# Patient Record
Sex: FEMALE | Race: WHITE | NOT HISPANIC OR LATINO
[De-identification: names, ages, dates, MRNs, and addresses within clinical notes are randomized per-mention and may not be internally consistent; named-entity substitution may affect disease eponyms.]

---

## 2019-01-07 ENCOUNTER — TRANSCRIPTION ENCOUNTER (OUTPATIENT)
Age: 59
End: 2019-01-07

## 2019-01-07 ENCOUNTER — APPOINTMENT (OUTPATIENT)
Dept: NEUROLOGY | Facility: CLINIC | Age: 59
End: 2019-01-07
Payer: COMMERCIAL

## 2019-01-07 VITALS
BODY MASS INDEX: 25.11 KG/M2 | DIASTOLIC BLOOD PRESSURE: 80 MMHG | HEART RATE: 87 BPM | HEIGHT: 67 IN | SYSTOLIC BLOOD PRESSURE: 124 MMHG | WEIGHT: 160 LBS

## 2019-01-07 PROCEDURE — 99205 OFFICE O/P NEW HI 60 MIN: CPT

## 2019-01-07 NOTE — HISTORY OF PRESENT ILLNESS
[FreeTextEntry1] : Patient presents with what she thinks may be OPMD and would like to be evaluated.  She states her mother, maternal grandfather, aunt and cousins have OPMD.  Mother can eat but very slowly, and has trouble walking.  Her aunt has a PEG and is more affected.  Her aunt is genetically confirmed with repeat expansion in PABPN1 gene.  Patient has two children, currently unaffected.  \par \par About ten years ago she noted that she had mild ptosis, slowly worsened and slightly worse on the right side.  She states she has some proximal leg weakness and proximal arm weakness for about 6 months, difficulty climbing stairs, rising from chair and holding arms up to do her hair.  Doesn't cough with liquids but states she gets food stuck in her throat when she swallows.  She states she has diplopia with looking to the right or up.  She denies SOB, and has had no falls.  \par \par She states she used to be very active and no longer is due to weakness.

## 2019-01-07 NOTE — PHYSICAL EXAM
[General Appearance - Alert] : alert [General Appearance - In No Acute Distress] : in no acute distress [Person] : oriented to person [Place] : oriented to place [Time] : oriented to time [Short Term Intact] : short term memory intact [Remote Intact] : remote memory intact [Registration Intact] : recent registration memory intact [Concentration Intact] : normal concentrating ability [Visual Intact] : visual attention was ~T not ~L decreased [Naming Objects] : no difficulty naming common objects [Repeating Phrases] : no difficulty repeating a phrase [Writing A Sentence] : no difficulty writing a sentence [Fluency] : fluency intact [Comprehension] : comprehension intact [Reading] : reading intact [Past History] : adequate knowledge of personal past history [Cranial Nerves Optic (II)] : visual acuity intact bilaterally,  visual fields full to confrontation, pupils equal round and reactive to light [Cranial Nerves Oculomotor (III)] : extraocular motion intact [Cranial Nerves Trigeminal (V)] : facial sensation intact symmetrically [Cranial Nerves Facial (VII)] : face symmetrical [Cranial Nerves Vestibulocochlear (VIII)] : hearing was intact bilaterally [Cranial Nerves Glossopharyngeal (IX)] : tongue and palate midline [Cranial Nerves Accessory (XI - Cranial And Spinal)] : head turning and shoulder shrug symmetric [Cranial Nerves Hypoglossal (XII)] : there was no tongue deviation with protrusion [Motor Tone] : muscle tone was normal in all four extremities [No Muscle Atrophy] : normal bulk in all four extremities [Motor Handedness Right-Handed] : the patient is right hand dominant [Hand Weakness Right] : normal hand  [+4] : arm extension  +4/5 [Hand Weakness Left] : normal hand  [5] : ankle inversion 5/5 [Sensation Tactile Decrease] : light touch was intact [Sensation Vibration Decrease] : vibration was intact [Proprioception] : proprioception was intact [Abnormal Walk] : normal gait [Balance] : balance was intact [Past-pointing] : there was no past-pointing [Tremor] : no tremor present [2+] : Ankle jerk left 2+ [Plantar Reflex Right Only] : normal on the right [Plantar Reflex Left Only] : normal on the left [FreeTextEntry5] : bilateral ptosis [FreeTextEntry8] : slight difficulty with squat [Neck Appearance] : the appearance of the neck was normal [Neck Cervical Mass (___cm)] : no neck mass was observed [Jugular Venous Distention Increased] : there was no jugular-venous distention [Thyroid Diffuse Enlargement] : the thyroid was not enlarged [Thyroid Nodule] : there were no palpable thyroid nodules [] : no respiratory distress [Auscultation Breath Sounds / Voice Sounds] : lungs were clear to auscultation bilaterally [Heart Rate And Rhythm] : heart rate was normal and rhythm regular [Heart Sounds] : normal S1 and S2 [Heart Sounds Gallop] : no gallops [Murmurs] : no murmurs [Heart Sounds Pericardial Friction Rub] : no pericardial rub

## 2019-01-07 NOTE — ASSESSMENT
[FreeTextEntry1] : Patient has a strong family h/o OPMD with genetic confirmation and a phenotype of ptosis, diplopia and proximal limb weakness that suggests strongly she also has OPMD.\par \par Will eval for repeat expansion in PABPN1 gene.  If positive, will monitor patient and provide support and consider her for any upcoming clinical trials.  \par \par

## 2019-02-11 ENCOUNTER — LABORATORY RESULT (OUTPATIENT)
Age: 59
End: 2019-02-11

## 2020-08-11 ENCOUNTER — APPOINTMENT (OUTPATIENT)
Dept: ENDOCRINOLOGY | Facility: CLINIC | Age: 60
End: 2020-08-11

## 2022-09-26 ENCOUNTER — APPOINTMENT (OUTPATIENT)
Dept: NEUROLOGY | Facility: CLINIC | Age: 62
End: 2022-09-26

## 2022-09-26 VITALS
WEIGHT: 160 LBS | DIASTOLIC BLOOD PRESSURE: 76 MMHG | BODY MASS INDEX: 25.11 KG/M2 | HEIGHT: 67 IN | HEART RATE: 71 BPM | SYSTOLIC BLOOD PRESSURE: 118 MMHG

## 2022-09-26 DIAGNOSIS — F41.9 ANXIETY DISORDER, UNSPECIFIED: ICD-10-CM

## 2022-09-26 DIAGNOSIS — G71.09 OTHER SPECIFIED MUSCULAR DYSTROPHIES: ICD-10-CM

## 2022-09-26 PROCEDURE — 99203 OFFICE O/P NEW LOW 30 MIN: CPT

## 2022-09-26 NOTE — DATA REVIEWED
[de-identified] : I reviewed the consultation report of Dr. 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  There are no other reports available in the electronic medical records.  General examination is unremarkable.

## 2022-09-26 NOTE — DISCUSSION/SUMMARY
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  I advised her that currently there is no treatment for cure but several assistive measures can be taken and and referred her for an ENT consultation because of slowness of swallowing and if any measures can be undertaken and she understands and will call [facility] division of ENT meanwhile she has no internist or family physician at this time and was encouraged to call [facility] Department of medicine to have a good medical evaluation maintenance of general health and she understands.  She will remain under the care of her psychiatrist for anxiety depression and ENT physicians including health maintenance and was encouraged to call for follow-up on a as needed basis or at least once a year so that a monitoring can be undertaken and if there is any experimental protocols it will be easy to refer her if she so desires.  She expressed understanding.

## 2022-09-26 NOTE — PHYSICAL EXAM
[FreeTextEntry1] : General examination is unremarkable and she is a very pleasant lady with a height of 5 feet 7 inches and her weight is 160 pounds and is right-handed.  Head neck, ear nose and throat is unremarkable.  There is no carotid bruit thyromegaly or lymphadenopathy.  Chest is clear and heart sounds are normal.  Abdomen is soft.  Pedal pulsations are normal and there is no leg edema.\par \par Neurologically she has normal memory language and cognitive abilities but her attention span is somewhat poor and she stated that this has happened since the COVID infection but is neurologically stable without any other sequela I of COVID infection.  Cranial nerve examination reveals mild weakness of the upward gaze of the left eye but without any clearly discernible diplopia as she has bilateral ptosis but is able to use her vision without any difficulty and her optic disks are normal visual fields are full there is no optic atrophy and extraocular movements are otherwise unremarkable with normal downward gaze.  Facial sensations are preserved but there is mild bilateral facial weakness with a minimal fishmouth opening.  Gag reflex is normal palatal arches are maintained and masseter muscles are normal and there is no loss of sense of smell or taste and neck muscles are +4/5.\par \par Motor evaluation revealed diffuse muscle weakness with mild hypotonia and her upper extremity muscles are +4/5 whereas lower extremity muscles are -5/5 in the distal muscles but dorsiflexors are +3/5 whereas plantar flexors are +4/5 and all deep tendon reflexes are trace to absent and there is no Babinski sign and careful sensory evaluation is completely normal including fine touch pinprick position and vibration sense and her gait is intact without any step as gait or foot drop gait and her proximal muscle functions appear normal in the legs as she has no difficulty in getting out of the chair.

## 2022-09-26 NOTE — REVIEW OF SYSTEMS
[Feeling Poorly] : feeling poorly [Decr. Concentrating Ability] : decreased concentrating ability [Facial Weakness] : facial weakness [Arm Weakness] : arm weakness [Hand Weakness] :  hand weakness [Leg Weakness] : leg weakness [Difficulty Writing] : difficulty writing [Tingling] : tingling [Difficulty Walking] : no difficulty walking [Inability to Walk] : able to walk [Ataxia] : no ataxia [Frequent Falls] : not falling [Limping] : limping [Anxiety] : anxiety [Depression] : depression [As Noted in HPI] : as noted in HPI [Negative] : Heme/Lymph

## 2022-09-26 NOTE — HISTORY OF PRESENT ILLNESS
[FreeTextEntry1] : Ms. Muro is a 61-year-old  female of [place] origin and was evaluated approximately 3-1/2 years ago by Dr. Brandon Cifuentes and a genetic testing was positive for ocular pharyngeal muscular dystrophy with a strong family history and today returns back for neurological consultation.\par \par She was evaluated by another neurologist on 1/7/2019 and because of her classic presentation suggested the diagnosis of ocular pharyngeal muscular dystrophy and stated that she had positive genetic testing including several family members have similar muscular dystrophy.  She has been relatively stable but believes that she is slightly weaker in the legs and had eyelid surgery in 2019 without any significant improvement though there was transient improvement for at least several months and stated that she has some swallowing problems including slow passage of water and she keeps clearing her throat after she drinks liquids but solids are easier but slow swallowing is noted and she occasionally has to drink water to facilitate solid food swallowing.  She denied any diplopia but in the past she had some vertical diplopia from the left eye.  There is diffuse muscle weakness in both upper and lower extremity which may be fluctuant but has more fatigue particularly in the legs.  There is vague history of some tingling in the feet but it is nonprogressive and no sensory symptoms are noted in the face or the upper extremity fingers and no shortness of breath palpitation or chest pain.\par \par Interim history reveals that she had COVID infection in December 2021 and had some COVID-related encephalopathy with memory issues and has been treated with Aricept and is relatively stable.\par \par Her past medical history was reviewed and had pelvic floor muscle weakness treated surgically for uterine prolapse.  Her father is suffering from advanced Parkinson's disease.  Several family members of ocular pharyngeal muscular dystrophy.  She is  with 2 children age 41 and 32 and 5 grandchildren and is currently not working because of progressive weakness and was relieved of her job her father is alive at age 90 and she has not been COVID vaccinated and she does not believe in vaccinations.\par \par There is also chronic history of anxiety depression and panic attacks and has been on buspirone lorazepam Wellbutrin mirtazapine and Aricept 10 mg daily and has no side effects of the medications and has been under the care of a psychiatrist.  She has no toxic habits and I reviewed her medications and allergies.

## 2022-09-27 ENCOUNTER — TRANSCRIPTION ENCOUNTER (OUTPATIENT)
Age: 62
End: 2022-09-27

## 2023-05-02 ENCOUNTER — APPOINTMENT (OUTPATIENT)
Dept: CARDIOLOGY | Facility: CLINIC | Age: 63
End: 2023-05-02
Payer: MEDICAID

## 2023-05-02 ENCOUNTER — NON-APPOINTMENT (OUTPATIENT)
Age: 63
End: 2023-05-02

## 2023-05-02 VITALS
BODY MASS INDEX: 24.17 KG/M2 | OXYGEN SATURATION: 98 % | TEMPERATURE: 97 F | WEIGHT: 154 LBS | RESPIRATION RATE: 16 BRPM | DIASTOLIC BLOOD PRESSURE: 65 MMHG | SYSTOLIC BLOOD PRESSURE: 95 MMHG | HEART RATE: 64 BPM | HEIGHT: 67 IN

## 2023-05-02 DIAGNOSIS — E78.00 PURE HYPERCHOLESTEROLEMIA, UNSPECIFIED: ICD-10-CM

## 2023-05-02 DIAGNOSIS — Z87.2 PERSONAL HISTORY OF DISEASES OF THE SKIN AND SUBCUTANEOUS TISSUE: ICD-10-CM

## 2023-05-02 DIAGNOSIS — R06.00 DYSPNEA, UNSPECIFIED: ICD-10-CM

## 2023-05-02 DIAGNOSIS — I20.9 ANGINA PECTORIS, UNSPECIFIED: ICD-10-CM

## 2023-05-02 DIAGNOSIS — F32.A ANXIETY DISORDER, UNSPECIFIED: ICD-10-CM

## 2023-05-02 DIAGNOSIS — F41.9 ANXIETY DISORDER, UNSPECIFIED: ICD-10-CM

## 2023-05-02 DIAGNOSIS — G72.9 MYOPATHY, UNSPECIFIED: ICD-10-CM

## 2023-05-02 DIAGNOSIS — Z86.39 PERSONAL HISTORY OF OTHER ENDOCRINE, NUTRITIONAL AND METABOLIC DISEASE: ICD-10-CM

## 2023-05-02 PROCEDURE — 93000 ELECTROCARDIOGRAM COMPLETE: CPT

## 2023-05-02 PROCEDURE — 99204 OFFICE O/P NEW MOD 45 MIN: CPT | Mod: 25

## 2023-05-02 NOTE — REVIEW OF SYSTEMS
[Feeling Fatigued] : feeling fatigued [Myalgia] : myalgia [Depression] : depression [Anxiety] : anxiety [Under Stress] : under stress [Negative] : Heme/Lymph [FreeTextEntry5] : see hpi

## 2023-05-02 NOTE — HISTORY OF PRESENT ILLNESS
[FreeTextEntry1] : 63 yo female with pmhx as below is here for initial eval\par chest pain with exertion last few months\par progressive in nature, decline in ET\par + gray as well\par long standing dyslipidemia, intolerant to multiple statins\par ros is otherwise as below

## 2023-07-06 ENCOUNTER — EMERGENCY (EMERGENCY)
Facility: HOSPITAL | Age: 63
LOS: 1 days | Discharge: HOME | End: 2023-07-06
Attending: EMERGENCY MEDICINE | Admitting: EMERGENCY MEDICINE
Payer: COMMERCIAL

## 2023-07-06 VITALS
OXYGEN SATURATION: 97 % | TEMPERATURE: 98 F | SYSTOLIC BLOOD PRESSURE: 147 MMHG | HEART RATE: 76 BPM | HEIGHT: 67 IN | DIASTOLIC BLOOD PRESSURE: 86 MMHG | WEIGHT: 154.32 LBS | RESPIRATION RATE: 18 BRPM

## 2023-07-06 PROCEDURE — 72110 X-RAY EXAM L-2 SPINE 4/>VWS: CPT

## 2023-07-06 PROCEDURE — 99053 MED SERV 10PM-8AM 24 HR FAC: CPT

## 2023-07-06 PROCEDURE — 99284 EMERGENCY DEPT VISIT MOD MDM: CPT | Mod: 25

## 2023-07-06 PROCEDURE — 70450 CT HEAD/BRAIN W/O DYE: CPT | Mod: 26,MA

## 2023-07-06 PROCEDURE — 72110 X-RAY EXAM L-2 SPINE 4/>VWS: CPT | Mod: 26

## 2023-07-06 PROCEDURE — 70450 CT HEAD/BRAIN W/O DYE: CPT | Mod: MA

## 2023-07-06 PROCEDURE — 99284 EMERGENCY DEPT VISIT MOD MDM: CPT

## 2023-07-06 RX ORDER — LIDOCAINE 4 G/100G
1 CREAM TOPICAL ONCE
Refills: 0 | Status: COMPLETED | OUTPATIENT
Start: 2023-07-06 | End: 2023-07-06

## 2023-07-06 RX ORDER — METHOCARBAMOL 500 MG/1
750 TABLET, FILM COATED ORAL ONCE
Refills: 0 | Status: COMPLETED | OUTPATIENT
Start: 2023-07-06 | End: 2023-07-06

## 2023-07-06 RX ORDER — ACETAMINOPHEN 500 MG
650 TABLET ORAL ONCE
Refills: 0 | Status: COMPLETED | OUTPATIENT
Start: 2023-07-06 | End: 2023-07-06

## 2023-07-06 RX ADMIN — METHOCARBAMOL 750 MILLIGRAM(S): 500 TABLET, FILM COATED ORAL at 23:36

## 2023-07-06 RX ADMIN — Medication 650 MILLIGRAM(S): at 23:36

## 2023-07-06 RX ADMIN — LIDOCAINE 1 PATCH: 4 CREAM TOPICAL at 23:36

## 2023-07-06 NOTE — ED PROVIDER NOTE - PATIENT PORTAL LINK FT
You can access the FollowMyHealth Patient Portal offered by [facility] by registering at the following website: [email or web address]. By joining [facility]’s FollowMyHealth portal, you will also be able to view your health information using other applications (apps) compatible with our system.
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
## 2023-07-06 NOTE — ED ADULT NURSE NOTE - NSFALLUNIVINTERV_ED_ALL_ED
Bed/Stretcher in lowest position, wheels locked, appropriate side rails in place/Call bell, personal items and telephone in reach/Instruct patient to call for assistance before getting out of bed/chair/stretcher/Non-slip footwear applied when patient is off stretcher/[place] to call system/Physically safe environment - no spills, clutter or unnecessary equipment/Purposeful proactive rounding/Room/bathroom lighting operational, light cord in reach
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
## 2023-07-06 NOTE — ED PROVIDER NOTE - CARE PROVIDER_API CALL
Joao Gar  Orthopaedic Surgery  [address], [address]  [place] [address]  Phone: [phone]  Fax: [phone]  Follow Up Time:     Сергей Azul [facility]  Spine Surgery  [address], [address]  [place] [address]  Phone: [phone]  Fax: [phone]  Follow Up Time:

## 2023-07-06 NOTE — ED PROVIDER NOTE - NSFOLLOWUPINSTRUCTIONS_ED_ALL_ED_FT
Lumbar Strain  A lumbar strain, which is sometimes called a low-back strain, is a stretch or tear in a muscle or the strong cords of tissue that attach muscle to bone (tendons) in the lower back (lumbar spine). This type of injury occurs when muscles or tendons are torn or are stretched beyond their limits.    Lumbar strains can range from mild to severe. Mild strains may involve stretching a muscle or tendon without tearing it. These may heal in 1–2 weeks. More severe strains involve tearing of muscle fibers or tendons. These will cause more pain and may take 6–8 weeks to heal.    What are the causes?  This condition may be caused by:  Trauma, such as a fall or a hit to the body.  Twisting or overstretching the back. This may result from doing activities that need a lot of energy, such as lifting heavy objects.  What increases the risk?  This injury is more common in:  Athletes.  People with obesity.  People who do repeated lifting, bending, or other movements that involve their back.  What are the signs or symptoms?  Symptoms of this condition may include:  Sharp or dull pain in the lower back that does not go away. The pain may extend to the buttocks.  Stiffness or limited range of motion.  Sudden muscle tightening (spasms).  How is this diagnosed?  This condition may be diagnosed based on:  Your symptoms.  Your medical history.  A physical exam.  Imaging tests, such as:  X-rays.  MRI.  How is this treated?  Treatment for this condition may include:  Rest.  Applying heat and cold to the affected area.  Over-the-counter medicines to help relieve pain and inflammation, such as NSAIDs.  Prescription pain medicine and muscle relaxants may be needed for a short time.  Physical therapy.  Follow these instructions at home:  Managing pain, stiffness, and swelling    Bag of ice on a towel on the skin.  A heating pad for use on the affected area.  If directed, put ice on the injured area during the first 24 hours after your injury.  Put ice in a plastic bag.  Place a towel between your skin and the bag.  Leave the ice on for 20 minutes, 2–3 times a day.  If directed, apply heat to the affected area as often as told by your health care provider. Use the heat source that your health care provider recommends, such as a moist heat pack or a heating pad.  Place a towel between your skin and the heat source.  Leave the heat on for 20–30 minutes.  Remove the heat if your skin turns bright red. This is especially important if you are unable to feel pain, heat, or cold. You may have a greater risk of getting burned.  Activity    Rest and return to your normal activities as told by your health care provider. Ask your health care provider what activities are safe for you.  Do exercises as told by your health care provider.  Medicines    Take over-the-counter and prescription medicines only as told by your health care provider.  Ask your health care provider if the medicine prescribed to you:  Requires you to avoid driving or using heavy machinery.  Can cause constipation. You may need to take these actions to prevent or treat constipation:  Drink enough fluid to keep your urine pale yellow.  Take over-the-counter or prescription medicines.  Eat foods that are high in fiber, such as beans, whole grains, and fresh fruits and vegetables.  Limit foods that are high in fat and processed sugars, such as fried or sweet foods.  Injury prevention    A person showing the correct and incorrect way to lift a heavy object. The correct way shows the person's knees bent.  To prevent a future low-back injury:  Always warm up properly before physical activity or sports.  Cool down and stretch after being active.  Use correct form when playing sports and lifting heavy objects. Bend your knees before you lift heavy objects.  Use good posture when sitting and standing.  Stay physically fit and keep a healthy weight.  Do at least 150 minutes of moderate-intensity exercise each week, such as brisk walking or water aerobics.  Do strength exercises at least 2 times each week.  General instructions    Do not use any products that contain nicotine or tobacco, such as cigarettes, e-cigarettes, and chewing tobacco. If you need help quitting, ask your health care provider.  Keep all follow-up visits as told by your health care provider. This is important.  Contact a health care provider if:  Your back pain does not improve after 6 weeks of treatment.  Your symptoms get worse.  Get help right away if:  Your back pain is severe.  You are unable to stand or walk.  You develop pain in your legs.  You develop weakness in your buttocks or legs.  You have difficulty controlling when you urinate or when you have a bowel movement.  You have frequent, painful, or bloody urination.  You have a temperature over 101.0°F (38.3°C)  Summary  A lumbar strain, which is sometimes called a low-back strain, is a stretch or tear in a muscle or the strong cords of tissue that attach muscle to bone (tendons) in the lower back (lumbar spine).  This type of injury occurs when muscles or tendons are torn or are stretched beyond their limits.  Rest and return to your normal activities as told by your health care provider. If directed, apply heat and ice to the affected area as often as told by your health care provider.  Take over-the-counter and prescription medicines only as told by your health care provider.  Contact a health care provider if you have new or worsening symptoms.  This information is not intended to replace advice given to you by your health care provider. Make sure you discuss any questions you have with your health care provider.

## 2023-07-06 NOTE — ED ADULT NURSE NOTE - ABDOMEN
soft/nondistended Information: Selecting Yes will display possible errors in your note based on the variables you have selected. This validation is only offered as a suggestion for you. PLEASE NOTE THAT THE VALIDATION TEXT WILL BE REMOVED WHEN YOU FINALIZE YOUR NOTE. IF YOU WANT TO FAX A PRELIMINARY NOTE YOU WILL NEED TO TOGGLE THIS TO 'NO' IF YOU DO NOT WANT IT IN YOUR FAXED NOTE.
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
## 2023-07-06 NOTE — ED PROVIDER NOTE - MUSCULOSKELETAL, MLM
Spine appears normal, range of motion is not limited, no muscle or joint tenderness. Mild paraspinal tenderness in lumbar area

## 2023-07-07 VITALS
TEMPERATURE: 98 F | DIASTOLIC BLOOD PRESSURE: 80 MMHG | RESPIRATION RATE: 17 BRPM | SYSTOLIC BLOOD PRESSURE: 125 MMHG | HEART RATE: 61 BPM | OXYGEN SATURATION: 98 %

## 2023-07-07 RX ADMIN — Medication 650 MILLIGRAM(S): at 00:30

## 2023-07-12 PROBLEM — F32.9 MAJOR DEPRESSIVE DISORDER, SINGLE EPISODE, UNSPECIFIED: Chronic | Status: ACTIVE | Noted: 2023-07-06

## 2023-07-12 PROBLEM — Z86.39 PERSONAL HISTORY OF OTHER ENDOCRINE, NUTRITIONAL AND METABOLIC DISEASE: Chronic | Status: ACTIVE | Noted: 2023-07-06

## 2023-07-19 ENCOUNTER — APPOINTMENT (OUTPATIENT)
Dept: ORTHOPEDIC SURGERY | Facility: CLINIC | Age: 63
End: 2023-07-19
Payer: COMMERCIAL

## 2023-07-19 VITALS — WEIGHT: 155 LBS | BODY MASS INDEX: 24.33 KG/M2 | HEIGHT: 67 IN

## 2023-07-19 DIAGNOSIS — M54.2 CERVICALGIA: ICD-10-CM

## 2023-07-19 PROCEDURE — 72050 X-RAY EXAM NECK SPINE 4/5VWS: CPT

## 2023-07-19 PROCEDURE — 73030 X-RAY EXAM OF SHOULDER: CPT | Mod: LT

## 2023-07-19 PROCEDURE — 99204 OFFICE O/P NEW MOD 45 MIN: CPT

## 2023-07-19 RX ORDER — GABAPENTIN 100 MG/1
100 CAPSULE ORAL
Qty: 30 | Refills: 1 | Status: ACTIVE | COMMUNITY
Start: 2023-07-19 | End: 1900-01-01

## 2023-07-19 NOTE — PHYSICAL EXAM
[de-identified] : Gen: NAD\par Resp: Nonlabored respirations\par Gait: Nl\par Head: CN I - XII grossly intact\par \par Spine:\par Skin in tact, tender paraspinal region\par Full neck ROM\par UE: 5/5 D B T WE WF IO b/l\par SILT C4-T1 b/l\par (-)Raya, Giana\par No hand atrophy\par 2+ rad bcr\par \par LE: 5/5 IP Q HS EHL TA GS\par SILT L3-S1 b/l\par (-) clonus, (-) babinski\par (-) slr, c/l slr\par 2+ dp pt wwp bcr \par \par L Shoulder:\par Skin intact\par Tender over posterolateral deltoid\par 170/170/60/lumbar AROM\par 4/5 ER IR Abduction\par (+) Mery/Song\par (+) Belly press/bear hug\par (-) Speed/yergason\par 5/5 D B T EPL FDP IO\par SILT amur\par 2+ rad bcr\par \par 1+ ant/post instability\par (-) O'kassy's\par (-) load and release\par (-) apprehension\par (-) Ghislaine, Jerk\par (-) sulcus sign\par (-) AC pain, cross body adduction  [de-identified] : The following radiographs were ordered and read by me during this patient's visit. I reviewed each radiograph in detail with the patient and discussed the findings as highlighted below.   3 views of the L shoulder were obtained today that show no fracture, or dislocation. There are no degenerative changes seen. There is no malalignment. No obvious osseous abnormality. Otherwise unremarkable. \par \par The following radiographs were ordered and read by me during this patient's visit. I reviewed each radiograph in detail with the patient and discussed the findings as highlighted below.   4 views of the cervical spine were obtained today that show no fracture, or dislocation. There is C5-C6 DJD with focal loss of lordosis. There is no malalignment or dynamic instability. Preserved lordosis.  No obvious osseous abnormality. Otherwise unremarkable.
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
## 2023-09-11 ENCOUNTER — APPOINTMENT (OUTPATIENT)
Dept: MRI IMAGING | Facility: CLINIC | Age: 63
End: 2023-09-11
Payer: COMMERCIAL

## 2023-09-11 ENCOUNTER — OUTPATIENT (OUTPATIENT)
Dept: OUTPATIENT SERVICES | Facility: HOSPITAL | Age: 63
LOS: 1 days | End: 2023-09-11
Payer: COMMERCIAL

## 2023-09-11 DIAGNOSIS — S43.429A SPRAIN OF UNSPECIFIED ROTATOR CUFF CAPSULE, INITIAL ENCOUNTER: ICD-10-CM

## 2023-09-11 PROCEDURE — 73221 MRI JOINT UPR EXTREM W/O DYE: CPT | Mod: 26,LT

## 2023-09-11 PROCEDURE — 73221 MRI JOINT UPR EXTREM W/O DYE: CPT

## 2023-09-18 ENCOUNTER — APPOINTMENT (OUTPATIENT)
Dept: ORTHOPEDIC SURGERY | Facility: CLINIC | Age: 63
End: 2023-09-18
Payer: COMMERCIAL

## 2023-09-18 PROCEDURE — 99213 OFFICE O/P EST LOW 20 MIN: CPT | Mod: 95

## 2023-09-20 ENCOUNTER — APPOINTMENT (OUTPATIENT)
Dept: ORTHOPEDIC SURGERY | Facility: CLINIC | Age: 63
End: 2023-09-20
Payer: COMMERCIAL

## 2023-09-20 VITALS
HEIGHT: 67 IN | DIASTOLIC BLOOD PRESSURE: 88 MMHG | SYSTOLIC BLOOD PRESSURE: 127 MMHG | BODY MASS INDEX: 24.33 KG/M2 | HEART RATE: 69 BPM | WEIGHT: 155 LBS

## 2023-09-20 DIAGNOSIS — M54.12 RADICULOPATHY, CERVICAL REGION: ICD-10-CM

## 2023-09-20 DIAGNOSIS — S43.429A SPRAIN OF UNSPECIFIED ROTATOR CUFF CAPSULE, INITIAL ENCOUNTER: ICD-10-CM

## 2023-09-20 PROCEDURE — 99214 OFFICE O/P EST MOD 30 MIN: CPT

## 2023-09-20 RX ORDER — GABAPENTIN 100 MG/1
100 CAPSULE ORAL
Qty: 30 | Refills: 0 | Status: ACTIVE | COMMUNITY
Start: 2023-09-20 | End: 1900-01-01

## 2025-05-22 NOTE — ED ADULT NURSE NOTE - HOW OFTEN DO YOU HAVE A DRINK CONTAINING ALCOHOL?
Update vision exam .  Check with insurance     Decrease dose of Semaglutide to 0.5 mg weekly  Start checking blood sugar and blood pressure at home     Order for swallow study, referral to GI  Started on Pantoprazole (acid reducer) 2 x day on empty stomach       
Never

## 2025-06-09 NOTE — ASSESSMENT
[FreeTextEntry1] : 63 yo female with pmhx and presentation as above\par intermediate risk for cad\par typical cp/gray\par ccta\par asa 81 check ins coverage for pcsk9 inhibitors\par bp soft, wont tolerate anti ischemic agents\par aggressive risk modif\par diet and act as tolerated\par rtc after ccta operating room